# Patient Record
Sex: FEMALE | Race: WHITE | NOT HISPANIC OR LATINO
[De-identification: names, ages, dates, MRNs, and addresses within clinical notes are randomized per-mention and may not be internally consistent; named-entity substitution may affect disease eponyms.]

---

## 2023-04-04 ENCOUNTER — NON-APPOINTMENT (OUTPATIENT)
Age: 33
End: 2023-04-04

## 2023-04-05 PROBLEM — Z00.00 ENCOUNTER FOR PREVENTIVE HEALTH EXAMINATION: Status: ACTIVE | Noted: 2023-04-05

## 2023-04-11 ENCOUNTER — APPOINTMENT (OUTPATIENT)
Dept: OTOLARYNGOLOGY | Facility: CLINIC | Age: 33
End: 2023-04-11
Payer: COMMERCIAL

## 2023-04-11 VITALS
BODY MASS INDEX: 19.29 KG/M2 | DIASTOLIC BLOOD PRESSURE: 85 MMHG | HEART RATE: 103 BPM | OXYGEN SATURATION: 97 % | HEIGHT: 66 IN | SYSTOLIC BLOOD PRESSURE: 120 MMHG | TEMPERATURE: 97.1 F | WEIGHT: 120 LBS

## 2023-04-11 DIAGNOSIS — R06.02 SHORTNESS OF BREATH: ICD-10-CM

## 2023-04-11 DIAGNOSIS — R13.10 DYSPHAGIA, UNSPECIFIED: ICD-10-CM

## 2023-04-11 DIAGNOSIS — K21.9 GASTRO-ESOPHAGEAL REFLUX DISEASE W/OUT ESOPHAGITIS: ICD-10-CM

## 2023-04-11 DIAGNOSIS — R09.89 OTHER SPECIFIED SYMPTOMS AND SIGNS INVOLVING THE CIRCULATORY AND RESPIRATORY SYSTEMS: ICD-10-CM

## 2023-04-11 PROCEDURE — 99205 OFFICE O/P NEW HI 60 MIN: CPT | Mod: 25

## 2023-04-11 PROCEDURE — 99202 OFFICE O/P NEW SF 15 MIN: CPT

## 2023-04-11 PROCEDURE — 31579 LARYNGOSCOPY TELESCOPIC: CPT

## 2023-04-11 RX ORDER — PANTOPRAZOLE 40 MG/1
40 TABLET, DELAYED RELEASE ORAL
Refills: 0 | Status: ACTIVE | COMMUNITY

## 2023-04-11 RX ORDER — FAMOTIDINE 20 MG/1
20 TABLET, FILM COATED ORAL
Qty: 90 | Refills: 0 | Status: ACTIVE | COMMUNITY
Start: 2023-04-11 | End: 1900-01-01

## 2023-04-11 RX ORDER — ALBUTEROL SULFATE 2.5 MG/.5ML
SOLUTION RESPIRATORY (INHALATION)
Refills: 0 | Status: ACTIVE | COMMUNITY

## 2023-04-11 RX ORDER — PANTOPRAZOLE 20 MG/1
20 TABLET, DELAYED RELEASE ORAL
Qty: 90 | Refills: 0 | Status: ACTIVE | COMMUNITY
Start: 2023-04-11 | End: 1900-01-01

## 2023-04-11 RX ORDER — FLUTICASONE FUROATE, UMECLIDINIUM BROMIDE AND VILANTEROL TRIFENATATE 200; 62.5; 25 UG/1; UG/1; UG/1
200-62.5-25 POWDER RESPIRATORY (INHALATION)
Refills: 0 | Status: ACTIVE | COMMUNITY

## 2023-04-11 RX ORDER — SERTRALINE HYDROCHLORIDE 25 MG/1
TABLET, FILM COATED ORAL
Refills: 0 | Status: ACTIVE | COMMUNITY

## 2023-04-12 NOTE — HISTORY OF PRESENT ILLNESS
[de-identified] : 4/11/23\par 33F presents with throat issues and SOB x 3 months. She reports sensation of food getting stuck in her throat. She also reports throat tightness and feeling like she's choking and food take a long time to pass. Happens with every meal every day. She is regurgitating chewed foods sometimes. No issues with liquids. She initially reported SOB at rest, still occurring about once a day. No voice changes. She did see a pulmonologist, had PFT's done and was diagnosed with moderate asthma and started on Trelegy. She also saw an allergist and was started on Pantoprazole 20mg x 2 weeks. Of note, she had a baby in November and did struggle with acid reflux during her pregnancy. \par \par Diet:\par + 1 cup Caffeine, blueberries, garlic, onion, carbonated beverages, spicy foods\par - chocolate, mint, citrus, tomato\par Glasses of water per day: 3-4 bottles per day \par Late night eating: no\par Alcohol use, especially at night:\par \par

## 2023-04-12 NOTE — PROCEDURE
[de-identified] : -\par Laryngoscopy: Flexible Laryngoscopy - Procedure Note\par \par Pre-operative Diagnosis: dysphagia, dyspnea \par Post-operative Diagnosis: laryngopharyngeal reflux, otherwise normal exam \par Anesthesia: Topical - 1 % Lidocaine/Phenylephrine\par Procedure: Flexible Laryngoscopy\par \par Procedure Details: \par The patient was placed in the sitting position. After decongestant and anesthesia were applied the laryngoscope was passed. The nasal cavities, nasopharynx, oropharynx, hypopharynx, and larynx were all examined. Vocal folds were examined during respiration and phonation. The following findings were noted:\par \par Findings: \par Nose: Septum is midline, turbinates are normal, nasal airways patent, mucosa normal\par Nasopharynx: Adenoids normal, no masses, eustachian tube normal\par Oropharynx: Pharyngeal walls symmetric and without lesion. Tonsils/fossae symmetric and normal.\par Hypopharynx: Hypopharynx and pyriform sinuses without lesion. No masses or asymmetry. No pooling of secretions.\par Larynx: Epiglottis and aryepiglottic folds were sharp and crisp bilaterally. Bilateral false and true vocal folds normal appearance. Bilateral vocal folds fully mobile and symmetric. Airway was widely patent. + post cricoid edema \par \par Condition: Stable. Patient tolerated procedure well.\par \par Complications: None\par \par

## 2023-04-12 NOTE — REASON FOR VISIT
[Initial Consultation] : an initial consultation for [FreeTextEntry2] : dysphagia and shortness of breath

## 2023-04-12 NOTE — ASSESSMENT
[FreeTextEntry1] : 33 year old female presents with dysphagia and dyspnea x 3 months. She was recently diagnosed with moderate asthma and started on Trelegy. On videostroboscopy, there is evidence of laryngopharyngeal reflux otherwise normal exam. Based on history and physical exam, I do believe there is a component of laryngopharyngeal reflux. At this time I am recommending dietary and behavioral change to reduce acid in the diet. I am also recommending omeprazole as well famotidine for a 3 months trial. \par \par Plan: \par - Dietary and behavioral modification to reduce acid reflux, handout given \par - Omeprazole qd as well as Famotidine qhs \par - Voice hygiene, increase hydration, sips of water throughout the day, avoid throat clearing\par - asthma treatment per pulm\par - fu 3 mo for repeat evaluation\par